# Patient Record
Sex: MALE | Race: WHITE | HISPANIC OR LATINO | Employment: FULL TIME | URBAN - METROPOLITAN AREA
[De-identification: names, ages, dates, MRNs, and addresses within clinical notes are randomized per-mention and may not be internally consistent; named-entity substitution may affect disease eponyms.]

---

## 2020-07-25 ENCOUNTER — APPOINTMENT (OUTPATIENT)
Dept: RADIOLOGY | Facility: IMAGING CENTER | Age: 20
End: 2020-07-25
Attending: PHYSICIAN ASSISTANT

## 2020-07-25 ENCOUNTER — OFFICE VISIT (OUTPATIENT)
Dept: URGENT CARE | Facility: PHYSICIAN GROUP | Age: 20
End: 2020-07-25

## 2020-07-25 VITALS
RESPIRATION RATE: 18 BRPM | SYSTOLIC BLOOD PRESSURE: 124 MMHG | TEMPERATURE: 99.3 F | WEIGHT: 175 LBS | BODY MASS INDEX: 25.92 KG/M2 | HEIGHT: 69 IN | OXYGEN SATURATION: 99 % | DIASTOLIC BLOOD PRESSURE: 88 MMHG | HEART RATE: 86 BPM

## 2020-07-25 DIAGNOSIS — S92.255A CLOSED NONDISPLACED FRACTURE OF NAVICULAR BONE OF LEFT FOOT, INITIAL ENCOUNTER: ICD-10-CM

## 2020-07-25 DIAGNOSIS — M79.672 FOOT PAIN, LEFT: ICD-10-CM

## 2020-07-25 DIAGNOSIS — S42.022A CLOSED DISPLACED FRACTURE OF SHAFT OF LEFT CLAVICLE, INITIAL ENCOUNTER: ICD-10-CM
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
## 2020-07-25 PROCEDURE — 99203 OFFICE O/P NEW LOW 30 MIN: CPT | Performed by: PHYSICIAN ASSISTANT

## 2020-07-25 PROCEDURE — 73030 X-RAY EXAM OF SHOULDER: CPT | Mod: TC,LT | Performed by: PHYSICIAN ASSISTANT

## 2020-07-25 PROCEDURE — 73630 X-RAY EXAM OF FOOT: CPT | Mod: TC,LT | Performed by: PHYSICIAN ASSISTANT

## 2020-07-25 ASSESSMENT — ENCOUNTER SYMPTOMS
COUGH: 0
FEVER: 0
NAUSEA: 0
SHORTNESS OF BREATH: 0
FALLS: 1
MYALGIAS: 0
HEADACHES: 0
DIARRHEA: 0
SORE THROAT: 0
CONSTIPATION: 0
EYE PAIN: 0
SPUTUM PRODUCTION: 0
VOMITING: 0
CHILLS: 0
ABDOMINAL PAIN: 0
DIZZINESS: 0

## 2020-07-25 NOTE — LETTER
July 25, 2020    To Whom It May Concern:         This is confirmation that Heber Soria attended his scheduled appointment with Bacilio Gavin P.A.-C. on 7/25/20.  It is my medical opinion that this patient needs to rest and recuperation and would likely not be able to work until he is cleared by an orthopedic specialist for his foot and shoulder injury.         If you have any questions please do not hesitate to call me at the phone number listed below.    Sincerely,          Bacilio Gavin P.A.-C.  [phone]
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
## 2020-07-26 NOTE — PATIENT INSTRUCTIONS
Terapia de RHCE para los cuidados de rutina de las lesiones  RICE Therapy for Routine Care of Injuries  Muchas lesiones pueden tratarse con reposo, hielo, compresión y elevación (terapia RHCE). Duarte incluye lo siguiente:  · Mantener la parte de la lesión en reposo.  · Aplicar hielo sobre la lesión.  · Ejercer presión (compresión) sobre la lesión.  · Elevar la parte lesionada (elevación).  La terapia RHCE puede ayudar a aliviar el dolor y reducir la hinchazón.  Materiales necesarios:  · Hielo.  · Bolsa plástica.  · Toalla.  · Vendaje elástico.  · Flako o varias almohadas para mantener en alto (elevar) la parte lesionada del cuerpo.  Cómo tratar la lesión con terapia de RHCE  Reposo  Limite las actividades que realiza normalmente e intente no utilizar la parte lesionada del cuerpo. Puede reanudar las actividades normales cuando el médico lo autorice y usted se sienta yung. Pregúntele al médico si debe realizar ejercicios para ayudar a que la lesión mejore.  Hielo  Aplique hielo sobre la geovany lesionada. No aplique el hielo directamente sobre la piel.  · Ponga el hielo en flako bolsa plástica.  · Coloque flako toalla entre la piel y la bolsa de hielo.  · Coloque el hielo mono 20 minutos, 2 a 3 veces por día. Aplique hielo tantos días myranda se lo haya indicado el médico.    Compresión  La compresión implica ejercer presión sobre la geovany lesionada. Duarte se puede realizar con flako venda elástica. Si se colocó flako venda elástica sobre la lesión:  · No ajuste demasiado la venda. Afloje la venda si alguna parte del cuerpo lejos de la venda se torna de color yaakov, se hincha, enfría o le duele, o si pierde la sensibilidad en lesly área (entumecimiento).  · Quítese la venda y colóquela nuevamente. Hágalo cada 3 o 4 horas o myranda se lo indique el médico.  · Consulte al médico si la venda parece empeorar los problemas.    Elevación  La elevación implica mantener elevada la geovany lesionada. Si puede, eleve la geovany lesionada por encima del  nivel del corazón o del centro del pecho.  Comuníquese con un médico si:  · El dolor y la hinchazón continúan.  · Los síntomas empeoran.  Solicite ayuda de inmediato si:  · Siente un dolor intenso repentino en la geovany de la lesión o por debajo de albert.  · Tiene irritación o más hinchazón alrededor de la lesión.  · Siente hormigueo o adormecimiento en la geovany de la lesión o por debajo de albert y no desaparece después de quitarse la venda.  Resumen  · Muchas lesiones se pueden tratar con reposo, hielo, compresión y elevación (terapia RHCE).  · Puede reanudar las actividades normales cuando se sienta yung y el médico lo autorice.  · Aplique hielo sobre la geovany lesionada, según las indicaciones del médico.  · Busque ayuda si los síntomas empeoran o si el dolor y la hinchazón continúan.  Esta información no tiene myranda fin reemplazar el consejo del médico. Asegúrese de hacerle al médico cualquier pregunta que tenga.  Document Released: 03/16/2010 Document Revised: 12/05/2018 Document Reviewed: 12/05/2018  Elsevier Patient Education © 2020 [employer]

## 2020-07-31 ENCOUNTER — ANESTHESIA EVENT (OUTPATIENT)
Dept: SURGERY | Facility: MEDICAL CENTER | Age: 20
End: 2020-07-31

## 2020-07-31 ENCOUNTER — HOSPITAL ENCOUNTER (OUTPATIENT)
Facility: MEDICAL CENTER | Age: 20
End: 2020-07-31
Attending: ORTHOPAEDIC SURGERY | Admitting: ORTHOPAEDIC SURGERY

## 2020-07-31 ENCOUNTER — APPOINTMENT (OUTPATIENT)
Dept: RADIOLOGY | Facility: MEDICAL CENTER | Age: 20
End: 2020-07-31
Attending: ORTHOPAEDIC SURGERY

## 2020-07-31 ENCOUNTER — ANESTHESIA (OUTPATIENT)
Dept: SURGERY | Facility: MEDICAL CENTER | Age: 20
End: 2020-07-31

## 2020-07-31 VITALS
BODY MASS INDEX: 25 KG/M2 | OXYGEN SATURATION: 94 % | SYSTOLIC BLOOD PRESSURE: 115 MMHG | TEMPERATURE: 97.9 F | HEIGHT: 71 IN | WEIGHT: 178.57 LBS | DIASTOLIC BLOOD PRESSURE: 73 MMHG | HEART RATE: 71 BPM | RESPIRATION RATE: 16 BRPM

## 2020-07-31 DIAGNOSIS — G89.18 POSTOPERATIVE PAIN: ICD-10-CM

## 2020-07-31 LAB
COVID ORDER STATUS COVID19: NORMAL
SARS-COV-2 RDRP RESP QL NAA+PROBE: NOTDETECTED
SPECIMEN SOURCE: NORMAL

## 2020-07-31 PROCEDURE — 160047 HCHG PACU  - EA ADDL 30 MINS PHASE II: Performed by: ORTHOPAEDIC SURGERY

## 2020-07-31 PROCEDURE — 700111 HCHG RX REV CODE 636 W/ 250 OVERRIDE (IP): Performed by: ANESTHESIOLOGY

## 2020-07-31 PROCEDURE — 160025 RECOVERY II MINUTES (STATS): Performed by: ORTHOPAEDIC SURGERY

## 2020-07-31 PROCEDURE — 160041 HCHG SURGERY MINUTES - EA ADDL 1 MIN LEVEL 4: Performed by: ORTHOPAEDIC SURGERY

## 2020-07-31 PROCEDURE — C1713 ANCHOR/SCREW BN/BN,TIS/BN: HCPCS | Performed by: ORTHOPAEDIC SURGERY

## 2020-07-31 PROCEDURE — 160048 HCHG OR STATISTICAL LEVEL 1-5: Performed by: ORTHOPAEDIC SURGERY

## 2020-07-31 PROCEDURE — A9270 NON-COVERED ITEM OR SERVICE: HCPCS | Performed by: ANESTHESIOLOGY

## 2020-07-31 PROCEDURE — 700105 HCHG RX REV CODE 258: Performed by: ORTHOPAEDIC SURGERY

## 2020-07-31 PROCEDURE — A9270 NON-COVERED ITEM OR SERVICE: HCPCS | Performed by: ORTHOPAEDIC SURGERY

## 2020-07-31 PROCEDURE — U0004 COV-19 TEST NON-CDC HGH THRU: HCPCS

## 2020-07-31 PROCEDURE — 501838 HCHG SUTURE GENERAL: Performed by: ORTHOPAEDIC SURGERY

## 2020-07-31 PROCEDURE — 700102 HCHG RX REV CODE 250 W/ 637 OVERRIDE(OP): Performed by: ANESTHESIOLOGY

## 2020-07-31 PROCEDURE — 160029 HCHG SURGERY MINUTES - 1ST 30 MINS LEVEL 4: Performed by: ORTHOPAEDIC SURGERY

## 2020-07-31 PROCEDURE — 500891 HCHG PACK, ORTHO MAJOR: Performed by: ORTHOPAEDIC SURGERY

## 2020-07-31 PROCEDURE — C9803 HOPD COVID-19 SPEC COLLECT: HCPCS | Performed by: ORTHOPAEDIC SURGERY

## 2020-07-31 PROCEDURE — 160036 HCHG PACU - EA ADDL 30 MINS PHASE I: Performed by: ORTHOPAEDIC SURGERY

## 2020-07-31 PROCEDURE — 700102 HCHG RX REV CODE 250 W/ 637 OVERRIDE(OP): Performed by: ORTHOPAEDIC SURGERY

## 2020-07-31 PROCEDURE — 160009 HCHG ANES TIME/MIN: Performed by: ORTHOPAEDIC SURGERY

## 2020-07-31 PROCEDURE — 73000 X-RAY EXAM OF COLLAR BONE: CPT | Mod: LT

## 2020-07-31 PROCEDURE — 700105 HCHG RX REV CODE 258: Performed by: ANESTHESIOLOGY

## 2020-07-31 PROCEDURE — 700101 HCHG RX REV CODE 250: Performed by: ORTHOPAEDIC SURGERY

## 2020-07-31 PROCEDURE — 160002 HCHG RECOVERY MINUTES (STAT): Performed by: ORTHOPAEDIC SURGERY

## 2020-07-31 PROCEDURE — 160035 HCHG PACU - 1ST 60 MINS PHASE I: Performed by: ORTHOPAEDIC SURGERY

## 2020-07-31 PROCEDURE — 160046 HCHG PACU - 1ST 60 MINS PHASE II: Performed by: ORTHOPAEDIC SURGERY

## 2020-07-31 PROCEDURE — 501445 HCHG STAPLER, SKIN DISP: Performed by: ORTHOPAEDIC SURGERY

## 2020-07-31 DEVICE — PLATE SUPERIOR MIDSHAFT CLAVICLE 8H LEFT (2TX2=4): Type: IMPLANTABLE DEVICE | Status: FUNCTIONAL

## 2020-07-31 DEVICE — SCREW 3.5 MM NON-LOCKING TI X 18MM LONG (6TX8+2TX5=58): Type: IMPLANTABLE DEVICE | Status: FUNCTIONAL

## 2020-07-31 DEVICE — SCREW 3.5 MM NON-LOCKING TI X 14MM LONG (6TX8+2TX5=58): Type: IMPLANTABLE DEVICE | Status: FUNCTIONAL

## 2020-07-31 DEVICE — SCREW 3.5 MM NON-LOCKING TI X 12MM LONG (6TX8+2TX5=58): Type: IMPLANTABLE DEVICE | Status: FUNCTIONAL

## 2020-07-31 DEVICE — SCREW 3.5 MM NON-LOCKING TI X 10MM LONG (6TX8+2TX5=58): Type: IMPLANTABLE DEVICE | Status: FUNCTIONAL

## 2020-07-31 RX ORDER — HALOPERIDOL 5 MG/ML
1 INJECTION INTRAMUSCULAR
Status: DISCONTINUED | OUTPATIENT
Start: 2020-07-31 | End: 2020-07-31 | Stop reason: HOSPADM

## 2020-07-31 RX ORDER — ONDANSETRON 2 MG/ML
4 INJECTION INTRAMUSCULAR; INTRAVENOUS
Status: DISCONTINUED | OUTPATIENT
Start: 2020-07-31 | End: 2020-07-31 | Stop reason: HOSPADM

## 2020-07-31 RX ORDER — DIPHENHYDRAMINE HYDROCHLORIDE 50 MG/ML
12.5 INJECTION INTRAMUSCULAR; INTRAVENOUS
Status: DISCONTINUED | OUTPATIENT
Start: 2020-07-31 | End: 2020-07-31 | Stop reason: HOSPADM

## 2020-07-31 RX ORDER — OXYCODONE HCL 5 MG/5 ML
5 SOLUTION, ORAL ORAL
Status: COMPLETED | OUTPATIENT
Start: 2020-07-31 | End: 2020-07-31

## 2020-07-31 RX ORDER — HYDROMORPHONE HYDROCHLORIDE 1 MG/ML
0.2 INJECTION, SOLUTION INTRAMUSCULAR; INTRAVENOUS; SUBCUTANEOUS
Status: DISCONTINUED | OUTPATIENT
Start: 2020-07-31 | End: 2020-07-31 | Stop reason: HOSPADM

## 2020-07-31 RX ORDER — HYDROMORPHONE HYDROCHLORIDE 1 MG/ML
0.1 INJECTION, SOLUTION INTRAMUSCULAR; INTRAVENOUS; SUBCUTANEOUS
Status: DISCONTINUED | OUTPATIENT
Start: 2020-07-31 | End: 2020-07-31 | Stop reason: HOSPADM

## 2020-07-31 RX ORDER — MIDAZOLAM HYDROCHLORIDE 1 MG/ML
INJECTION INTRAMUSCULAR; INTRAVENOUS PRN
Status: DISCONTINUED | OUTPATIENT
Start: 2020-07-31 | End: 2020-07-31 | Stop reason: SURG

## 2020-07-31 RX ORDER — SODIUM CHLORIDE, SODIUM LACTATE, POTASSIUM CHLORIDE, CALCIUM CHLORIDE 600; 310; 30; 20 MG/100ML; MG/100ML; MG/100ML; MG/100ML
INJECTION, SOLUTION INTRAVENOUS CONTINUOUS
Status: DISCONTINUED | OUTPATIENT
Start: 2020-07-31 | End: 2020-07-31 | Stop reason: HOSPADM

## 2020-07-31 RX ORDER — OXYCODONE HCL 5 MG/5 ML
10 SOLUTION, ORAL ORAL
Status: COMPLETED | OUTPATIENT
Start: 2020-07-31 | End: 2020-07-31

## 2020-07-31 RX ORDER — ONDANSETRON 2 MG/ML
INJECTION INTRAMUSCULAR; INTRAVENOUS PRN
Status: DISCONTINUED | OUTPATIENT
Start: 2020-07-31 | End: 2020-07-31 | Stop reason: SURG

## 2020-07-31 RX ORDER — DEXAMETHASONE SODIUM PHOSPHATE 4 MG/ML
INJECTION, SOLUTION INTRA-ARTICULAR; INTRALESIONAL; INTRAMUSCULAR; INTRAVENOUS; SOFT TISSUE PRN
Status: DISCONTINUED | OUTPATIENT
Start: 2020-07-31 | End: 2020-07-31 | Stop reason: SURG

## 2020-07-31 RX ORDER — OXYCODONE HYDROCHLORIDE 5 MG/1
5 TABLET ORAL EVERY 4 HOURS PRN
Qty: 20 TAB | Refills: 0 | Status: SHIPPED | OUTPATIENT
Start: 2020-07-31 | End: 2020-08-05

## 2020-07-31 RX ORDER — ACETAMINOPHEN 500 MG
500-1000 TABLET ORAL EVERY 6 HOURS PRN
COMMUNITY

## 2020-07-31 RX ORDER — BUPIVACAINE HYDROCHLORIDE AND EPINEPHRINE 5; 5 MG/ML; UG/ML
INJECTION, SOLUTION EPIDURAL; INTRACAUDAL; PERINEURAL
Status: DISCONTINUED | OUTPATIENT
Start: 2020-07-31 | End: 2020-07-31 | Stop reason: HOSPADM

## 2020-07-31 RX ORDER — HYDROMORPHONE HYDROCHLORIDE 1 MG/ML
0.4 INJECTION, SOLUTION INTRAMUSCULAR; INTRAVENOUS; SUBCUTANEOUS
Status: DISCONTINUED | OUTPATIENT
Start: 2020-07-31 | End: 2020-07-31 | Stop reason: HOSPADM

## 2020-07-31 RX ORDER — CEFAZOLIN SODIUM 1 G/3ML
INJECTION, POWDER, FOR SOLUTION INTRAMUSCULAR; INTRAVENOUS PRN
Status: DISCONTINUED | OUTPATIENT
Start: 2020-07-31 | End: 2020-07-31 | Stop reason: SURG

## 2020-07-31 RX ADMIN — FENTANYL CITRATE 150 MCG: 50 INJECTION, SOLUTION INTRAMUSCULAR; INTRAVENOUS at 13:40

## 2020-07-31 RX ADMIN — FENTANYL CITRATE 100 MCG: 50 INJECTION, SOLUTION INTRAMUSCULAR; INTRAVENOUS at 13:33

## 2020-07-31 RX ADMIN — MIDAZOLAM HYDROCHLORIDE 2 MG: 1 INJECTION, SOLUTION INTRAMUSCULAR; INTRAVENOUS at 13:23

## 2020-07-31 RX ADMIN — PROPOFOL 200 MG: 10 INJECTION, EMULSION INTRAVENOUS at 13:23

## 2020-07-31 RX ADMIN — ONDANSETRON 4 MG: 2 INJECTION INTRAMUSCULAR; INTRAVENOUS at 13:23

## 2020-07-31 RX ADMIN — CEFAZOLIN 2 G: 330 INJECTION, POWDER, FOR SOLUTION INTRAMUSCULAR; INTRAVENOUS at 13:34

## 2020-07-31 RX ADMIN — FENTANYL CITRATE 25 MCG: 50 INJECTION INTRAMUSCULAR; INTRAVENOUS at 14:43

## 2020-07-31 RX ADMIN — SODIUM CHLORIDE, POTASSIUM CHLORIDE, SODIUM LACTATE AND CALCIUM CHLORIDE: 600; 310; 30; 20 INJECTION, SOLUTION INTRAVENOUS at 14:54

## 2020-07-31 RX ADMIN — FENTANYL CITRATE 25 MCG: 50 INJECTION INTRAMUSCULAR; INTRAVENOUS at 14:41

## 2020-07-31 RX ADMIN — SODIUM CHLORIDE, POTASSIUM CHLORIDE, SODIUM LACTATE AND CALCIUM CHLORIDE: 600; 310; 30; 20 INJECTION, SOLUTION INTRAVENOUS at 12:41

## 2020-07-31 RX ADMIN — OXYCODONE HYDROCHLORIDE 5 MG: 5 SOLUTION ORAL at 14:41

## 2020-07-31 RX ADMIN — POVIDONE-IODINE 15 ML: 10 SOLUTION TOPICAL at 12:41

## 2020-07-31 RX ADMIN — DEXAMETHASONE SODIUM PHOSPHATE 8 MG: 4 INJECTION, SOLUTION INTRA-ARTICULAR; INTRALESIONAL; INTRAMUSCULAR; INTRAVENOUS; SOFT TISSUE at 13:23

## 2020-07-31 ASSESSMENT — PAIN SCALES - GENERAL: PAIN_LEVEL: 3

## 2020-07-31 NOTE — ANESTHESIA POSTPROCEDURE EVALUATION
Patient: Heber Soria    Procedure Summary     Date:  07/31/20 Room / Location:  [facility] OR 16 / SURGERY [facility] ORS    Anesthesia Start:  1324 Anesthesia Stop:  1412    Procedure:  ORIF, FRACTURE, CLAVICLE (Left Chest) Diagnosis:  (DISPLACED FRACTURE OF SHAFT OF LEFT CLAVICLE SUBSEQUENT ENCOUNTER FOR FRACTURE WITH ROUTINE HEALING)    Surgeon:  Jose Bunch M.D. Responsible Provider:  Salvador Velasquez M.D.    Anesthesia Type:  general ASA Status:  1          Final Anesthesia Type: general  Last vitals  BP   Blood Pressure: 118/76    Temp   36.8 °C (98.2 °F)    Pulse   Pulse: 81   Resp   16    SpO2   96 %      Anesthesia Post Evaluation    Patient location during evaluation: PACU  Patient participation: complete - patient participated  Level of consciousness: awake  Pain score: 3    Airway patency: patent  Anesthetic complications: no  Cardiovascular status: adequate  Respiratory status: acceptable  Hydration status: acceptable    PONV: none           Nurse Pain Score: 0 (NPRS)

## 2020-07-31 NOTE — ANESTHESIA QCDR
2019 [facility] Clinical Data Registry (for Quality Improvement)     Postoperative nausea/vomiting risk protocol (Adult = 18 yrs and Pediatric 3-17 yrs)- (430 and 463)  General inhalation anesthetic (NOT TIVA) with PONV risk factors: Yes  Provision of anti-emetic therapy with at least 2 different classes of agents: Yes   Patient DID NOT receive anti-emetic therapy and reason is documented in Medical Record:  N/A    Multimodal Pain Management- (477)  Non-emergent surgery AND patient age >= 18: Yes  Use of Multimodal Pain Management, two or more drugs and/or interventions, NOT including systemic opioids: No  Exception: Documented allergy to multiple classes of analgesics: No       Smoking Abstinence (404)  Patient is current smoker (cigarette, pipe, e-cig, marijuanna): No  Elective Surgery:   Abstinence instructions provided prior to day of surgery:   Patient abstained from smoking on day of surgery:     Pre-Op Beta-Blocker in Isolated CABG (44)  Isolated CABG AND patient age >= 18: No  Beta-blocker admin within 24 hours of surgical incision:   Exception:of medical reason(s) for not administering beta blocker within 24 hours prior to surgical incision (e.g., not  indicated,other medical reason):     PACU assessment of acute postoperative pain prior to Anesthesia Care End- Applies to Patients Age = 18- (ABG7)  Initial PACU pain score is which of the following: < 7/10  Patient unable to report pain score: N/A    Post-anesthetic transfer of care checklist/protocol to PACU/ICU- (426 and 427)  Upon conclusion of case, patient transferred to which of the following locations: PACU/Non-ICU  Use of transfer checklist/protocol: Yes  Exclusion: Service Performed in Patient Hospital Room (and thus did not require transfer): N/A  Unplanned admission to ICU related to anesthesia service up through end of PACU care- (MD51)  Unplanned admission to ICU (not initially anticipated at anesthesia start time): No

## 2020-07-31 NOTE — H&P
7/31/2020    Heber Soria is a 20 y.o. male who presents after a fall with a left clavicle fracture and is here for management. Patient denies numbness, parasthesias, loss of conciousness or other trauma    No past medical history on file.    No past surgical history on file.    Medications  No current facility-administered medications on file prior to encounter.      No current outpatient medications on file prior to encounter.       Allergies  Patient has no known allergies.    ROS  Left shoulder pain. All other systems were reviewed and found to be negative    No family history on file.    Social History     Socioeconomic History   • Marital status: Single     Spouse name: Not on file   • Number of children: Not on file   • Years of education: Not on file   • Highest education level: Not on file   Occupational History   • Not on file   Social Needs   • Financial resource strain: Not on file   • Food insecurity     Worry: Not on file     Inability: Not on file   • Transportation needs     Medical: Not on file     Non-medical: Not on file   Tobacco Use   • Smoking status: Not on file   Substance and Sexual Activity   • Alcohol use: Not on file   • Drug use: Not on file   • Sexual activity: Not on file   Lifestyle   • Physical activity     Days per week: Not on file     Minutes per session: Not on file   • Stress: Not on file   Relationships   • Social connections     Talks on phone: Not on file     Gets together: Not on file     Attends Holiness service: Not on file     Active member of club or organization: Not on file     Attends meetings of clubs or organizations: Not on file     Relationship status: Not on file   • Intimate partner violence     Fear of current or ex partner: Not on file     Emotionally abused: Not on file     Physically abused: Not on file     Forced sexual activity: Not on file   Other Topics Concern   • Not on file   Social History Narrative   • Not on file       Physical  "Exam  Vitals  /76   Pulse 81   Temp 36.8 °C (98.2 °F) (Temporal)   Resp 16   Ht 1.803 m (5' 11\")   Wt 81 kg (178 lb 9.2 oz)   SpO2 96%   General: Well Developed, Well Nourished, no acute distress  HEENT: Normocephalic, atraumatic  Eyes: Anicteric, PERRLA, EOMI  Neck: Supple, nontender, no masses  Lungs: CTA, no wheezes or crackles  Heart: RRR, no murmurs, rubs or gallops  Abdomen: Soft, NT, ND  Pelvis: Stable to AP and Lateral Compression  Skin: Intact, no open wounds  Extremities: TTP Left clavicle  Neuro: M/R/U/A intact  Vascular: 2+Rad/Uln, Capillary refill <2 seconds    Radiographs:  No orders to display       Laboratory Values      No results for input(s): SODIUM, POTASSIUM, CHLORIDE, CO2, GLUCOSE, BUN, CPKTOTAL in the last 72 hours.          Impression: Left clavicle fracture    Plan:Operative intervention. Risks and benefits of surgery were discussed which include but are not limited to bleeding, infection, neurovascular damage, malunion, nonunion, instability, limb length discrepancy, DVT, PE, MI, Stroke and death. They understand these risks and wish to proceed.    "

## 2020-07-31 NOTE — OR NURSING
Pt in recovery,  ipad utilized, pt states 5/10 pain in clavicle area, medicated per MAR, pt states relief, denies nausea, dressing CDI.

## 2020-07-31 NOTE — OP REPORT
DATE OF SERVICE:  07/31/2020    PREOPERATIVE DIAGNOSIS:  Displaced left clavicle fracture.      POSTOPERATIVE DIAGNOSIS:  Displaced left clavicle fracture.      PROCEDURE:  Open reduction and internal fixation, left clavicle.      NAME OF SURGEON:  Jose Stewart MD    ASSISTANT:  None.      ESTIMATED BLOOD LOSS:  None.      INDICATIONS:  This is a 20-year-old male status post fall during which he   sustained a left displaced clavicle fracture.  Risks and benefits of operative   fixation were discussed, which include but not limited to bleeding,   infection, neurovascular damage, pain, stiffness, malunion, nonunion, DVT, PE,   MI, stroke, and death.  They understand all these risks and wished to   proceed.      DESCRIPTION OF PROCEDURE:  Patient was sedated with LMA anesthesia and   administered preoperative antibiotics.  Left shoulder was prepped and draped   in usual sterile fashion.  A standard superior approach to clavicle was   performed with care taken to avoid all neurovascular structures.  The fracture   reduced in anatomic position and held with [facility] superior clavicular locking   plate with a combination of locking and nonlocking fixation.  All screws were   checked and found to be appropriate length.  Now, the joint reduction was   found to be anatomic.  Wounds were irrigated, closed with 0 Vicryl suture, 2-0   Vicryl suture and staples.  Sterile dressings were applied.  Patient   tolerated the procedure well.      POSTOPERATIVE PLAN:  Patient to be 5-pound weightbearing.  Admitted for   perioperative antibiotics, DVT prophylaxis, and pain control.  We will see him   back in clinic in 2 weeks' time for wound check.       ____________________________________     JOSE STEWART MD PLA / NTS    DD:  07/31/2020 14:06:21  DT:  07/31/2020 16:30:00    D#:  [id number]  Job#:  [id number]

## 2020-07-31 NOTE — DISCHARGE INSTRUCTIONS
ACTIVITY: Rest and take it easy for the first 24 hours.  A responsible adult is recommended to remain with you during that time.  It is normal to feel sleepy.  We encourage you to not do anything that requires balance, judgment or coordination.    MILD FLU-LIKE SYMPTOMS ARE NORMAL. YOU MAY EXPERIENCE GENERALIZED MUSCLE ACHES, THROAT IRRITATION, HEADACHE AND/OR SOME NAUSEA.    FOR 24 HOURS DO NOT:  Drive, operate machinery or run household appliances.  Drink beer or alcoholic beverages.   Make important decisions or sign legal documents.    SPECIAL INSTRUCTIONS:     The patient should remain 5 lbs weightbearing with the use of gait aids (crutches, walker, cane, etc).     PAIN CONTROL:  The patient has been prescribed a narcotic pain medication.  Side effects of narcotics pain medications include sedation and constipation.  To prevent constipation, it is recommended that the patient take an over the counter stool softener (such as Colace or Senna) and use laxatives as needed to prevent constipation.  Take these over the counter medications as directed by the packaging.  The patient may not drive while taking narcotic pain medication.  The patient should wean off their prescription pain medication by taking over the counter analgesics (such as Tylenol, Advil, Ibuprofen, etc).  Use caution when taking acetaminophen (Tylenol), as some narcotic medications contain acetaminophen.  The total dose of acetaminophen should not exceed 3000 mg daily.     WOUND CARE:  The patient has a surgical wound which was closed with staples or sutures.  These need to be removed 10-14 days after surgery.  If the patient is not in a splint or cast, the operative dressing should be removed 2 days after surgery, and dry gauze dressing changes should be performed daily after that.  You may shower when the operative dressing is removed.     SPLINT/CAST CARE:  If present, you should keep your splint or cast clean, dry, and intact.  You should  elevate your operative extremity to minimize swelling in your fingers or toes.  If the sensation in your fingers or toes of your operative extremity changes, or the fingers/toes change colors, or should your pain become too difficult to control, you should immediately elevate your operative extremity.  If these symptoms do not resolve after 30 minutes to 1 hour, you should seek medical care immediately.     CALL YOUR DOCTOR IF:  You have fever >101.5 degrees F, you have increased or concerning wound drainage, you notice a great deal of redness around your incision, your pain can no longer be controlled, the sensation in your fingers or toes changes and does not respond to elevation, your cast or splint becomes saturated (wet) or other concerns.  If you suffer a medical emergency, seek immediate medical care at your nearest Emergency Room.    DIET: To avoid nausea, slowly advance diet as tolerated, avoiding spicy or greasy foods for the first day.  Add more substantial food to your diet according to your physician's instructions.  Babies can be fed formula or breast milk as soon as they are hungry.  INCREASE FLUIDS AND FIBER TO AVOID CONSTIPATION.    FOLLOW-UP APPOINTMENT:  A follow-up appointment should be arranged with your doctor in 1-2 weeks; call to schedule.    You should CALL YOUR PHYSICIAN if you develop:  Fever greater than 101 degrees F.  Pain not relieved by medication, or persistent nausea or vomiting.  Excessive bleeding (blood soaking through dressing) or unexpected drainage from the wound.  Extreme redness or swelling around the incision site, drainage of pus or foul smelling drainage.  Inability to urinate or empty your bladder within 8 hours.  Problems with breathing or chest pain.    You should call 911 if you develop problems with breathing or chest pain.  If you are unable to contact your doctor or surgical center, you should go to the nearest emergency room or urgent care center.  Physician's  telephone #: [phone]    If any questions arise, call your doctor.  If your doctor is not available, please feel free to call the Surgical Center at [phone].  The Center is open Monday through Friday from 7AM to 7PM.  You can also call the HEALTH HOTLINE open 24 hours/day, 7 days/week and speak to a nurse at [phone], or toll free at [phone].    A registered nurse may call you a few days after your surgery to see how you are doing after your procedure.    MEDICATIONS: Resume taking daily medication.  Take prescribed pain medication with food.  If no medication is prescribed, you may take non-aspirin pain medication if needed.  PAIN MEDICATION CAN BE VERY CONSTIPATING.  Take a stool softener or laxative such as senokot, pericolace, or milk of magnesia if needed.    Prescription given for Oxycodone.  Last pain medication given at 2:41 PM 5mg    If your physician has prescribed pain medication that includes Acetaminophen (Tylenol), do not take additional Acetaminophen (Tylenol) while taking the prescribed medication.    Depression / Suicide Risk    As you are discharged from this [facility] facility, it is important to learn how to keep safe from harming yourself.    Recognize the warning signs:  · Abrupt changes in personality, positive or negative- including increase in energy   · Giving away possessions  · Change in eating patterns- significant weight changes-  positive or negative  · Change in sleeping patterns- unable to sleep or sleeping all the time   · Unwillingness or inability to communicate  · Depression  · Unusual sadness, discouragement and loneliness  · Talk of wanting to die  · Neglect of personal appearance   · Rebelliousness- reckless behavior  · Withdrawal from people/activities they love  · Confusion- inability to concentrate     If you or a loved one observes any of these behaviors or has concerns about self-harm, here's what you can do:  · Talk about it- your feelings and  reasons for harming yourself  · Remove any means that you might use to hurt yourself (examples: pills, rope, extension cords, firearm)  · Get professional help from the community (Mental Health, Substance Abuse, psychological counseling)  · Do not be alone:Call your Safe Contact- someone whom you trust who will be there for you.  · Call your local CRISIS HOTLINE 791-6088 or 580-938-2387  · Call your local Children's Mobile Crisis Response Team [place] (752) 944-4720 or Localmind  · Call the toll free National Suicide Prevention Hotlines   · National Suicide Prevention Lifeline 086-810-XDVQ (3567)  National ToVieFor Line Network 800-SUICIDE (341-0357)Instrucciones Para [place]  (Home Care Instructions)    ACTIVIDAD: Descanse y tome todo con mucha calma las primeras 24 horas después de brown cirugía.  Flako persona adulta responsable debe permanecer con usted mono deepa periodo de tiempo.  Es normal sentirse sonoliento o sonolienta mono esas primeras horas.  Le recomendamos que no taryn nada que requiera equilibrio, eliana decisiones a mucha coordinación de brown parte.    NO TARYN ESTO PURANTE LAS PRIMERAS 24 HORAS:   Manejar o conducir algún vehiculo, operar maquinarias o utilizar electrodomesticos.   Beber cerveza o algún otro tipo de bebida alcohólica.   Eliana decisiones importantes o firmar documentos legales.    INSTRUCCIONES ESPECIALES:    El paciente debería permanecer 5 libras weightbearing con el uso de recursos de paso (muletas, paseante, caña, etc.).     CONTROL DE DOLOR: el paciente ha sido prescribido flako medicación de dolor narcótica. Los efectos secundarios de medicaciones de dolor de narcóticos incluyen la sedación y el estreñimiento. Para prevenir el estreñimiento, es recomendado esto el paciente jennifer un sobre el suavizador de taburete contrario (myranda Colace o Jacinta) y usa laxantes myranda necesario prevenir el estreñimiento. [place] éstos sobre las medicaciones contrarias myranda dirigido por el  embalaje. El paciente puede no conducir tomando la medicación de dolor narcótica. El paciente debería wean de brown medicación de dolor de prescripción asumiendo los analgésicos contrarios (ankit Tylenol, Advil, Ibuprofen, etc.). La precaución de uso tomando el paracetamol (Tylenol), cuando algunas medicaciones narcóticas contienen el paracetamol. La dosis total de paracetamol no debería exceder 3000 mg. diariamente.     CARE de HERIDA: el paciente tiene flako herida quirúrgica que estuvo cerrada con grapas o suturas. Éstos tienen que ser    DIETA: Para evitar las nauseas, prosiga despacito con brown dieta a medida que pueda ir tolerándola mejor, evite comidas muy condimentadas o grasosas mono deepa primer día.  Vaya agregando comidas más substanciadas a brown dieta a medida que asi lo indique brown médica.  Los bebés pueden beber leche preparada o formula, ásl anikt también leche del seno de la madre a medida que vayan teniendo hambre.  SIGA AGREGANDO LIQUIDOS Y COMIDAS CON FIBRA PARA EVITAR ESTREÑIMIENTO.    ANKIT BAÑARSE Y CAMBIAR LOS VENDAJES DE LA CIRUGIA: ***    MEDICAMENTOS/MEDICINAS:  Vuelva a alysha dusty medicamentos diarios.  El Moro los medicamentos que se le prescribe con un poco de comida.  Si no le prescribe ningún tipo de medicamento, entonces puede alysha medicinas para el dolor que no contienen aspirina, si las necesita.  LAS MEDICINAS PARA EL DOLOR PUEDEN ESTREÑIRLE MUCHO.  El Moro un suavizante para el excremento o materia fecal (stool softener) o un laxativo ankit por ejemplo: senokot, pericolase, o leche de magnesia, si lo necesita.    La prescripción la administro Oxycodone  La ultima sosis de medicina para el dolor fue administrada 2:41 PM    Se debe hacer flako consulta medica con el doctor en ***, Líame para hacer la dana.    Usted debe LIAMAR A BROWN MEDICO si tiene los siguientes síntomas:   -   Flako fiebre más huber de 101 grados Fahrenheit.   -   Un dolor incesante aún con los medicamentos, o nauseas y vómito  persistente.   -   Un sangrado excesivo (varsha que traspasa los vendajes o gasas) o algúln tipo de drenaje inesperado que proviene de la henda.     -   Un color moran exagerado o hinchazón alrededor del área en donde se le hizo incisión o alejandro, o un drenaje de pus o con olor deep proveniente de la henda.   -    La inhabilidad de orinar o vaciar brown vejiga en 8 horas.   -    Problemas con a respiración o darci en el pecho.    Usted debe llamar al 911 si se presentan problemas con el dolor al respirar o el pecho.  Si no se puede ponnoer en comunicación con un medica o con el centro de cirugía, usted debe ir a la estación de emergencia (emergency room) más cercana o a un centro de atención de urgencia (urgent care center).  El teléfono del medico es: ***    LOS SÍNTOMAS DE UN LEVE RESFRIO SON MUY NORMALES.  ADEMÁS USTED PUEDE LLEGAR A SENTIR DARCI GENERALES DE MÚSCULOS, IRRITACIÓN EN LA [place], DARCI DE ISABEL Y/O UN POCO DE NAUSEAS.    Sie tiene alguna pregunta, llame a brown médico.  Si brown médico no se encuentra disponible, por favor llame al Centro de Cirugía at {Surgical Dept Numbers:[id number]}.  el Centro está abierto de Lunes a Viernes desde las 7:00 de la manana hasta las 7:00 de la noche.  Usted también puede llamar al CENTRO DE LLAMADAS SOBRE LA OPAL o HEALTH HOTLINE.  Phyllis está abierto viente y cuatro horas por shyam, siete seymour por semana, allí podrá hablar con flako enfermera.  Llame al [phone], o al número demetrio 6 [phone].    Mi firma a continuación indica que he recibido y entiendco estas instrucciones acera de los cuidados en la casa (Home Care Instructions)    · Usted recibirá flako encuesta en la correspondencia en las siguientes semanas y le pedimos que por favor tome un momento para completar lesly encuesta y regresaría a hosotros.  Nuestro objetivó es brindarle un cuidado muy joshi y par lo tanto apreciamos dusty coméntanos.  Muchas alessandra por noemy escogido el Centro [facility] Cirugía de [facility].

## 2020-07-31 NOTE — ANESTHESIA PREPROCEDURE EVALUATION
Relevant Problems   No relevant active problems       Physical Exam    Airway   Mallampati: II  TM distance: >3 FB       Cardiovascular - normal exam  Rhythm: regular  Rate: normal     Dental - normal exam           Pulmonary    Abdominal - normal exam  Abdomen: soft     Neurological - normal exam                 Anesthesia Plan    ASA 1       Plan - general       Airway plan will be LMA      Plan Factors:   Patient was not previously instructed to abstain from smoking on day of procedure.  Patient did not smoke on day of procedure.      Induction: intravenous          Informed Consent:    Anesthetic plan and risks discussed with patient.

## 2020-07-31 NOTE — ANESTHESIA PROCEDURE NOTES
Airway    Date/Time: 7/31/2020 1:30 PM  Performed by: Salvador Velasquez M.D.  Authorized by: Salvador Velasquez M.D.     Location:  OR  Urgency:  Elective  Difficult Airway: No    Indications for Airway Management:  Anesthesia      Spontaneous Ventilation: present    Sedation Level:  Deep  Preoxygenated: Yes    Patient Position:  Sniffing  MILS Maintained Throughout: No    Mask Difficulty Assessment:  0 - not attempted  Final Airway Type:  Supraglottic airway  Final Supraglottic Airway:  Standard LMA    SGA Size:  5  Number of Attempts at Approach:  1

## 2020-07-31 NOTE — ANESTHESIA TIME REPORT
Anesthesia Start and Stop Event Times     Date Time Event    7/31/2020 1256 Ready for Procedure     1324 Anesthesia Start     1412 Anesthesia Stop        Responsible Staff  07/31/20    Name Role Begin End    Salvador Velasquez M.D. Anesth 1324 1412        Preop Diagnosis (Free Text):  Pre-op Diagnosis     DISPLACED FRACTURE OF SHAFT OF LEFT CLAVICLE SUBSEQUENT ENCOUNTER FOR FRACTURE WITH ROUTINE HEALING        Preop Diagnosis (Codes):    Post op Diagnosis  Clavicle fracture      Premium Reason  Non-Premium    Comments:

## 2020-08-01 NOTE — OR NURSING
Pt's VSS; denies N/V; states pain is at tolerable level. Dressing dry and intact with light red/pink drainage. D/c orders received. IV removed. Pt changed into clothing with assistance. Discharge instructions given as well as pain management handout; pt and family verbalized understanding and questions answered. Patient states ready to d/c home.Rx for oxycodone given to pt. Pt dc'd in w/c with CNA in stable condition.

## (undated) DEVICE — SLEEVE, VASO, THIGH, MED

## (undated) DEVICE — ELECTRODE 850 FOAM ADHESIVE - HYDROGEL RADIOTRNSPRNT (50/PK)

## (undated) DEVICE — STAPLER SKIN DISP - (6/BX 10BX/CA) VISISTAT

## (undated) DEVICE — SUTURE 2-0 VICRYL PLUS CT-1 - 8 X 18 INCH(12/BX)

## (undated) DEVICE — LACTATED RINGERS INJ 1000 ML - (14EA/CA 60CA/PF)

## (undated) DEVICE — DRILL BIT 2.8X200 PERC CALIB - (6TX2=12)

## (undated) DEVICE — DRAPE 36X28IN RAD CARM BND BG - (25/CA) O

## (undated) DEVICE — TUBING CLEARLINK DUO-VENT - C-FLO (48EA/CA)

## (undated) DEVICE — CANISTER SUCTION 3000ML MECHANICAL FILTER AUTO SHUTOFF MEDI-VAC NONSTERILE LF DISP  (40EA/CA)

## (undated) DEVICE — MASK ANESTHESIA ADULT  - (100/CA)

## (undated) DEVICE — HEAD HOLDER JUNIOR/ADULT

## (undated) DEVICE — SUCTION INSTRUMENT YANKAUER OPEN TIP W/O VENT (50EA/CA)

## (undated) DEVICE — SODIUM CHL IRRIGATION 0.9% 1000ML (12EA/CA)

## (undated) DEVICE — SET EXTENSION WITH 2 PORTS (48EA/CA) ***PART #2C8610 IS A SUBSTITUTE*****

## (undated) DEVICE — PROTECTOR ULNA NERVE - (36PR/CA)

## (undated) DEVICE — PACK MAJOR ORTHO - (2EA/CA)

## (undated) DEVICE — SENSOR SPO2 NEO LNCS ADHESIVE (20/BX) SEE USER NOTES

## (undated) DEVICE — NEPTUNE 4 PORT MANIFOLD - (20/PK)

## (undated) DEVICE — DRESSING TRANSPARENT FILM TEGADERM 4 X 4.75" (50EA/BX)"

## (undated) DEVICE — GOWN WARMING STANDARD FLEX - (30/CA)

## (undated) DEVICE — ELECTRODE DUAL RETURN W/ CORD - (50/PK)

## (undated) DEVICE — SUCTION INSTRUMENT YANKAUER BULBOUS TIP W/O VENT (50EA/CA)

## (undated) DEVICE — SET LEADWIRE 5 LEAD BEDSIDE DISPOSABLE ECG (1SET OF 5/EA)

## (undated) DEVICE — TUBE CONNECT SUCTION CLEAR 120 X 1/4" (50EA/CA)"

## (undated) DEVICE — PENCIL ELECTSURG 10FT BTN SWH - (50/CA)

## (undated) DEVICE — GLOVE BIOGEL SZ 7.5 SURGICAL PF LTX - (50PR/BX 4BX/CA)

## (undated) DEVICE — GLOVE BIOGEL INDICATOR SZ 8 SURGICAL PF LTX - (50/BX 4BX/CA)

## (undated) DEVICE — SUTURE 0 VICRYL PLUS CT-1 - 8 X 18 INCH (12/BX)

## (undated) DEVICE — KIT ANESTHESIA W/CIRCUIT & 3/LT BAG W/FILTER (20EA/CA)

## (undated) DEVICE — NEEDLE NON SAFETY HYPO 22 GA X 1 1/2 IN (100/BX)

## (undated) DEVICE — SUTURE GENERAL

## (undated) DEVICE — SYRINGE 30 ML LL (56/BX)